# Patient Record
Sex: FEMALE | Race: WHITE
[De-identification: names, ages, dates, MRNs, and addresses within clinical notes are randomized per-mention and may not be internally consistent; named-entity substitution may affect disease eponyms.]

---

## 2018-01-04 ENCOUNTER — HOSPITAL ENCOUNTER (EMERGENCY)
Dept: HOSPITAL 25 - UCEAST | Age: 63
Discharge: HOME | End: 2018-01-04
Payer: COMMERCIAL

## 2018-01-04 DIAGNOSIS — M48.07: ICD-10-CM

## 2018-01-04 DIAGNOSIS — N39.0: Primary | ICD-10-CM

## 2018-01-04 PROCEDURE — 81003 URINALYSIS AUTO W/O SCOPE: CPT

## 2018-01-04 PROCEDURE — 72100 X-RAY EXAM L-S SPINE 2/3 VWS: CPT

## 2018-01-04 PROCEDURE — 87086 URINE CULTURE/COLONY COUNT: CPT

## 2018-01-04 PROCEDURE — G0463 HOSPITAL OUTPT CLINIC VISIT: HCPCS

## 2018-01-04 PROCEDURE — 99202 OFFICE O/P NEW SF 15 MIN: CPT

## 2018-01-04 NOTE — UC
Complaint Female HPI





- HPI Summary


HPI Summary: 





61 yo WF c/o right LBP x 1 day since yesterday, exacerbated by movement but 

denies muscular tenderness. Denies dysuria, urinary frequency and urgency/f/c 

but feels that the right LBP somewhat worsens after she voids, BM is regular 

without straining





- History Of Current Complaint


Chief Complaint: UCBackPain


Stated Complaint: BACK PAIN


Time Seen by Provider: 01/04/18 10:50


Hx Obtained From: Patient


Onset/Duration: Sudden Onset


Timing: Lasting Hours


Severity Initially: Moderate


Severity Currently: Moderate


Character: Sharp, Dull


Aggravating Factor(s): Movement


Alleviating Factor(s): Nothing


Associated Signs And Symptoms: Positive: Negative





- Allergies/Home Medications


Allergies/Adverse Reactions: 


 Allergies











Allergy/AdvReac Type Severity Reaction Status Date / Time


 


Aspirin Allergy  throat Verified 01/04/18 09:55





   swells  


 


Monosodium Glutamate Allergy  nausea Verified 01/04/18 09:55





   vomiting  


 


caffiene Allergy  nausea Uncoded 01/04/18 09:56





   ,vomiting  














PMH/Surg Hx/FS Hx/Imm Hx





- Surgical History


Surgical History: Yes





- Social History


Alcohol Use: Occasionally


Substance Use Type: None


Smoking Status (MU): Never Smoked Tobacco





Review of Systems


Constitutional: Negative


Skin: Negative


Eyes: Negative


ENT: Negative


Respiratory: Negative


Cardiovascular: Negative


Gastrointestinal: Negative


Genitourinary: Negative - SEE HPI


Motor: Negative


Neurovascular: Negative


Musculoskeletal: Negative


Neurological: Negative


Psychological: Negative


All Other Systems Reviewed And Are Negative: Yes





Physical Exam


Triage Information Reviewed: Yes


Appearance: Pain Distress


Vital Signs: 


 Initial Vital Signs











Temp  36.6 C   01/04/18 09:56


 


Pulse  90   01/04/18 09:56


 


Resp  18   01/04/18 09:56


 


BP  134/80   01/04/18 09:56


 


Pulse Ox  100   01/04/18 09:56











Eye Exam: Normal


ENT Exam: Normal


Dental Exam: Normal


Neck exam: Normal


Neck: Positive: 1


Respiratory Exam: Normal


Cardiovascular Exam: Normal


Abdomen Description: Positive: CVA Tenderness (R)


Musculoskeletal Exam: Normal


Neurological Exam: Normal


Psychological Exam: Normal


Skin Exam: Normal





 Complaint Female Dx





- Course


Course Of Treatment: lumbar XR neg for straightening of lumbar lordosis, UA is 

neg for LE, blood but pH of 7.0 and given Right CVA tenderness, will tx for 

occult ascending kidney infection.





- Differential Dx/Diagnosis


Provider Diagnoses: UTI





Discharge





- Discharge Plan


Condition: Stable


Disposition: HOME


Prescriptions: 


Nitrofurantoin Monohyd Macro [Macrobid] 100 mg PO BID 7 Days #14 cap


Patient Education Materials:  Urinary Tract Infection in Women (ED), Urinary 

Traction Infection in Older Adults (ED)


Referrals: 


Jong Archuleta DO [Primary Care Provider] - 


Additional Instructions: 


as tolerated

## 2018-01-04 NOTE — RAD
INDICATION:  Sudden back pain.



COMPARISON:  There are no prior studies available for comparison.



TECHNIQUE: 3 views of the lumbar spine were obtained including lateral, AP and a

coned-down lateral view of the lumbar sacral junction.



FINDINGS: The vertebra are in normal alignment. No fracture is seen.  



There is mild disc space narrowing at the L1-L2 and L5-S1 levels.



IMPRESSION:  MILD DISC SPACE NARROWING AT THE L1-L2 AND L5-S1 LEVELS.

## 2018-01-05 NOTE — UC
- Progress Note


Progress Note: 





Final urine culture with no growth. She may stop the antibiotic. Should follow 

up with PCP regarding back pain/CVA tenderness if still experiencing symptoms.





Course/Dx





- Course


Course Of Treatment: lumbar XR neg for straightening of lumbar lordosis, UA is 

neg for LE, blood but pH of 7.0 and given Right CVA tenderness, will tx for 

occult ascending kidney infection.